# Patient Record
Sex: MALE | Race: WHITE | Employment: OTHER | URBAN - METROPOLITAN AREA
[De-identification: names, ages, dates, MRNs, and addresses within clinical notes are randomized per-mention and may not be internally consistent; named-entity substitution may affect disease eponyms.]

---

## 2020-10-27 ENCOUNTER — HOSPITAL ENCOUNTER (OUTPATIENT)
Age: 65
Discharge: HOME | End: 2020-10-29

## 2020-10-27 PROCEDURE — 88305 TISSUE EXAM BY PATHOLOGIST: CPT

## 2021-05-10 ENCOUNTER — HOSPITAL ENCOUNTER (OUTPATIENT)
Age: 66
Discharge: HOME | End: 2021-05-10
Payer: MEDICARE

## 2021-05-10 LAB
HEPATITIS C ANTIBODY INTERPRETATION: NORMAL
PROSTATE SPECIFIC ANTIGEN: 1.62 NG/ML (ref 0–4)

## 2021-05-10 PROCEDURE — G0103 PSA SCREENING: HCPCS

## 2021-05-10 PROCEDURE — 36415 COLL VENOUS BLD VENIPUNCTURE: CPT

## 2021-05-10 PROCEDURE — 86803 HEPATITIS C AB TEST: CPT

## 2021-10-11 ENCOUNTER — HOSPITAL ENCOUNTER (OUTPATIENT)
Age: 66
Discharge: HOME | End: 2021-10-11
Payer: MEDICARE

## 2021-10-11 LAB
ALBUMIN SERPL-MCNC: 4.3 G/DL (ref 3.5–5.2)
ALP BLD-CCNC: 77 U/L (ref 40–129)
ALT SERPL-CCNC: 12 U/L (ref 0–40)
ANION GAP SERPL CALCULATED.3IONS-SCNC: 9 MMOL/L (ref 7–16)
AST SERPL-CCNC: 14 U/L (ref 0–39)
BASOPHILS ABSOLUTE: 0.05 E9/L (ref 0–0.2)
BASOPHILS RELATIVE PERCENT: 0.8 % (ref 0–2)
BILIRUB SERPL-MCNC: 0.6 MG/DL (ref 0–1.2)
BUN BLDV-MCNC: 18 MG/DL (ref 6–23)
C-REACTIVE PROTEIN, HIGH SENSITIVITY: 0.6 MG/L (ref 0–3)
CALCIUM SERPL-MCNC: 9.5 MG/DL (ref 8.6–10.2)
CHLORIDE BLD-SCNC: 105 MMOL/L (ref 98–107)
CHOLESTEROL, TOTAL: 193 MG/DL (ref 0–199)
CO2: 28 MMOL/L (ref 22–29)
CREAT SERPL-MCNC: 1 MG/DL (ref 0.7–1.2)
EOSINOPHILS ABSOLUTE: 0.34 E9/L (ref 0.05–0.5)
EOSINOPHILS RELATIVE PERCENT: 5.3 % (ref 0–6)
GFR AFRICAN AMERICAN: >60
GFR NON-AFRICAN AMERICAN: >60 ML/MIN/1.73
GLUCOSE BLD-MCNC: 109 MG/DL (ref 74–99)
HCT VFR BLD CALC: 46.3 % (ref 37–54)
HDLC SERPL-MCNC: 46 MG/DL
HEMOGLOBIN: 15.3 G/DL (ref 12.5–16.5)
IMMATURE GRANULOCYTES #: 0.02 E9/L
IMMATURE GRANULOCYTES %: 0.3 % (ref 0–5)
LDL CHOLESTEROL CALCULATED: 118 MG/DL (ref 0–99)
LYMPHOCYTES ABSOLUTE: 2.51 E9/L (ref 1.5–4)
LYMPHOCYTES RELATIVE PERCENT: 39.5 % (ref 20–42)
MCH RBC QN AUTO: 31.7 PG (ref 26–35)
MCHC RBC AUTO-ENTMCNC: 33 % (ref 32–34.5)
MCV RBC AUTO: 95.9 FL (ref 80–99.9)
MONOCYTES ABSOLUTE: 0.55 E9/L (ref 0.1–0.95)
MONOCYTES RELATIVE PERCENT: 8.6 % (ref 2–12)
NEUTROPHILS ABSOLUTE: 2.89 E9/L (ref 1.8–7.3)
NEUTROPHILS RELATIVE PERCENT: 45.5 % (ref 43–80)
PDW BLD-RTO: 12.8 FL (ref 11.5–15)
PLATELET # BLD: 284 E9/L (ref 130–450)
PMV BLD AUTO: 10.3 FL (ref 7–12)
POTASSIUM SERPL-SCNC: 4.1 MMOL/L (ref 3.5–5)
RBC # BLD: 4.83 E12/L (ref 3.8–5.8)
SODIUM BLD-SCNC: 142 MMOL/L (ref 132–146)
T3 FREE: 2.9 PG/ML (ref 2–4.4)
T3 TOTAL: 110.6 NG/DL (ref 80–200)
T4 FREE: 1.23 NG/DL (ref 0.93–1.7)
T4 TOTAL: 7.6 MCG/DL (ref 4.5–11.7)
TOTAL PROTEIN: 7.2 G/DL (ref 6.4–8.3)
TRIGL SERPL-MCNC: 147 MG/DL (ref 0–149)
TSH SERPL DL<=0.05 MIU/L-ACNC: 4.46 UIU/ML (ref 0.27–4.2)
VITAMIN D 25-HYDROXY: 58 NG/ML (ref 30–100)
VLDLC SERPL CALC-MCNC: 29 MG/DL
WBC # BLD: 6.4 E9/L (ref 4.5–11.5)

## 2021-10-11 PROCEDURE — 85025 COMPLETE CBC W/AUTO DIFF WBC: CPT

## 2021-10-11 PROCEDURE — 84439 ASSAY OF FREE THYROXINE: CPT

## 2021-10-11 PROCEDURE — 84443 ASSAY THYROID STIM HORMONE: CPT

## 2021-10-11 PROCEDURE — 80061 LIPID PANEL: CPT

## 2021-10-11 PROCEDURE — 36415 COLL VENOUS BLD VENIPUNCTURE: CPT

## 2021-10-11 PROCEDURE — 86141 C-REACTIVE PROTEIN HS: CPT

## 2021-10-11 PROCEDURE — 80053 COMPREHEN METABOLIC PANEL: CPT

## 2021-10-11 PROCEDURE — 82306 VITAMIN D 25 HYDROXY: CPT

## 2021-10-11 PROCEDURE — 84481 FREE ASSAY (FT-3): CPT

## 2022-04-11 ENCOUNTER — HOSPITAL ENCOUNTER (OUTPATIENT)
Age: 67
Discharge: HOME | End: 2022-04-11
Payer: MEDICARE

## 2022-04-11 LAB
ANION GAP SERPL CALCULATED.3IONS-SCNC: 7 MMOL/L (ref 7–16)
BUN BLDV-MCNC: 19 MG/DL (ref 6–23)
CALCIUM SERPL-MCNC: 9.6 MG/DL (ref 8.6–10.2)
CHLORIDE BLD-SCNC: 103 MMOL/L (ref 98–107)
CHOLESTEROL, TOTAL: 192 MG/DL (ref 0–199)
CO2: 30 MMOL/L (ref 22–29)
CREAT SERPL-MCNC: 1 MG/DL (ref 0.7–1.2)
GFR AFRICAN AMERICAN: >60
GFR NON-AFRICAN AMERICAN: >60 ML/MIN/1.73
GLUCOSE BLD-MCNC: 115 MG/DL (ref 74–99)
HBA1C MFR BLD: 5.8 % (ref 4–5.6)
HDLC SERPL-MCNC: 49 MG/DL
LDL CHOLESTEROL CALCULATED: 101 MG/DL (ref 0–99)
POTASSIUM SERPL-SCNC: 4.8 MMOL/L (ref 3.5–5)
SODIUM BLD-SCNC: 140 MMOL/L (ref 132–146)
T3 FREE: 3.3 PG/ML (ref 2–4.4)
T3 TOTAL: 105.7 NG/DL (ref 80–200)
T4 FREE: 1.16 NG/DL (ref 0.93–1.7)
TRIGL SERPL-MCNC: 210 MG/DL (ref 0–149)
TSH SERPL DL<=0.05 MIU/L-ACNC: 3.22 UIU/ML (ref 0.27–4.2)
VLDLC SERPL CALC-MCNC: 42 MG/DL

## 2022-04-11 PROCEDURE — 80048 BASIC METABOLIC PNL TOTAL CA: CPT

## 2022-04-11 PROCEDURE — 84439 ASSAY OF FREE THYROXINE: CPT

## 2022-04-11 PROCEDURE — 84482 T3 REVERSE: CPT

## 2022-04-11 PROCEDURE — 84481 FREE ASSAY (FT-3): CPT

## 2022-04-11 PROCEDURE — 83036 HEMOGLOBIN GLYCOSYLATED A1C: CPT

## 2022-04-11 PROCEDURE — 84443 ASSAY THYROID STIM HORMONE: CPT

## 2022-04-11 PROCEDURE — 36415 COLL VENOUS BLD VENIPUNCTURE: CPT

## 2022-04-11 PROCEDURE — 80061 LIPID PANEL: CPT

## 2022-04-16 LAB — T3 REVERSE: 13.6 NG/DL (ref 9–27)

## 2023-12-01 PROBLEM — E78.5 HLD (HYPERLIPIDEMIA): Status: ACTIVE | Noted: 2023-12-01

## 2023-12-01 PROBLEM — J44.9 COPD (CHRONIC OBSTRUCTIVE PULMONARY DISEASE) (HCC): Status: ACTIVE | Noted: 2023-12-01

## 2023-12-04 ENCOUNTER — OFFICE VISIT (OUTPATIENT)
Dept: CARDIOLOGY CLINIC | Age: 68
End: 2023-12-04
Payer: MEDICARE

## 2023-12-04 VITALS
BODY MASS INDEX: 24.8 KG/M2 | OXYGEN SATURATION: 96 % | SYSTOLIC BLOOD PRESSURE: 141 MMHG | RESPIRATION RATE: 16 BRPM | HEIGHT: 67 IN | DIASTOLIC BLOOD PRESSURE: 89 MMHG | WEIGHT: 158 LBS | HEART RATE: 72 BPM

## 2023-12-04 DIAGNOSIS — I48.0 PAF (PAROXYSMAL ATRIAL FIBRILLATION) (HCC): Primary | ICD-10-CM

## 2023-12-04 PROCEDURE — G8484 FLU IMMUNIZE NO ADMIN: HCPCS | Performed by: INTERNAL MEDICINE

## 2023-12-04 PROCEDURE — G8420 CALC BMI NORM PARAMETERS: HCPCS | Performed by: INTERNAL MEDICINE

## 2023-12-04 PROCEDURE — 4004F PT TOBACCO SCREEN RCVD TLK: CPT | Performed by: INTERNAL MEDICINE

## 2023-12-04 PROCEDURE — 3017F COLORECTAL CA SCREEN DOC REV: CPT | Performed by: INTERNAL MEDICINE

## 2023-12-04 PROCEDURE — 1123F ACP DISCUSS/DSCN MKR DOCD: CPT | Performed by: INTERNAL MEDICINE

## 2023-12-04 PROCEDURE — 93000 ELECTROCARDIOGRAM COMPLETE: CPT | Performed by: INTERNAL MEDICINE

## 2023-12-04 PROCEDURE — 99205 OFFICE O/P NEW HI 60 MIN: CPT | Performed by: INTERNAL MEDICINE

## 2023-12-04 PROCEDURE — G8427 DOCREV CUR MEDS BY ELIG CLIN: HCPCS | Performed by: INTERNAL MEDICINE

## 2023-12-04 RX ORDER — LEVOTHYROXINE SODIUM 0.1 MG/1
25 TABLET ORAL DAILY
COMMUNITY

## 2023-12-04 RX ORDER — DILTIAZEM HYDROCHLORIDE 120 MG/1
120 CAPSULE, EXTENDED RELEASE ORAL DAILY
COMMUNITY
End: 2023-12-04 | Stop reason: SDUPTHER

## 2023-12-04 RX ORDER — ATORVASTATIN CALCIUM 10 MG/1
10 TABLET, FILM COATED ORAL DAILY
COMMUNITY

## 2023-12-04 RX ORDER — DILTIAZEM HYDROCHLORIDE 120 MG/1
120 TABLET, FILM COATED ORAL 4 TIMES DAILY
COMMUNITY
End: 2023-12-04 | Stop reason: CLARIF

## 2023-12-04 RX ORDER — DILTIAZEM HYDROCHLORIDE 120 MG/1
120 CAPSULE, EXTENDED RELEASE ORAL 2 TIMES DAILY
COMMUNITY
End: 2023-12-04 | Stop reason: CLARIF

## 2023-12-05 ENCOUNTER — TELEPHONE (OUTPATIENT)
Dept: CARDIOLOGY CLINIC | Age: 68
End: 2023-12-05

## 2023-12-05 DIAGNOSIS — I48.0 PAF (PAROXYSMAL ATRIAL FIBRILLATION) (HCC): Primary | ICD-10-CM

## 2023-12-05 RX ORDER — DILTIAZEM HYDROCHLORIDE 120 MG/1
120 CAPSULE, EXTENDED RELEASE ORAL DAILY
Qty: 60 CAPSULE | Refills: 5 | Status: SHIPPED
Start: 2023-12-05 | End: 2023-12-05 | Stop reason: SDUPTHER

## 2023-12-05 NOTE — TELEPHONE ENCOUNTER
Patient called and stated she has questions that he forget yesterday    He would like to know his EF from his echo in [place]    He would like to know if he should still be taking ASA    He also started the Eliquis is going to cost him 500.00 a month and he cannot afford this.   Please advise on what he can be switched to

## 2023-12-06 RX ORDER — DILTIAZEM HYDROCHLORIDE 120 MG/1
120 CAPSULE, EXTENDED RELEASE ORAL 2 TIMES DAILY
Qty: 60 CAPSULE | Refills: 5 | Status: SHIPPED | OUTPATIENT
Start: 2023-12-06

## 2023-12-08 ENCOUNTER — TELEPHONE (OUTPATIENT)
Dept: CARDIOLOGY CLINIC | Age: 68
End: 2023-12-08

## 2023-12-08 NOTE — TELEPHONE ENCOUNTER
Patent given coupon for Eliquis 30 days free until then      Grecia notified at [address] [facility]

## 2023-12-08 NOTE — TELEPHONE ENCOUNTER
Kit Astudillo from [facility], [address] clinic called and stated they cannot get patient in for evaluation until after the beginning of the new year. Does this patient need to be on a blood thinner prior to this? His PCP is in [facility] [employer] so they are asking if we can manage  this until then.   Please advise

## 2023-12-13 RX ORDER — WARFARIN SODIUM 5 MG/1
TABLET ORAL
Qty: 30 TABLET | Refills: 3 | Status: SHIPPED | OUTPATIENT
Start: 2023-12-13

## 2024-01-11 ENCOUNTER — HOSPITAL ENCOUNTER (OUTPATIENT)
Dept: PHARMACY | Age: 69
Setting detail: THERAPIES SERIES
Discharge: HOME | End: 2024-01-11
Payer: MEDICARE

## 2024-01-11 VITALS
DIASTOLIC BLOOD PRESSURE: 70 MMHG | BODY MASS INDEX: 25.53 KG/M2 | WEIGHT: 163 LBS | SYSTOLIC BLOOD PRESSURE: 112 MMHG | HEART RATE: 71 BPM

## 2024-01-11 DIAGNOSIS — I48.0 PAF (PAROXYSMAL ATRIAL FIBRILLATION) (HCC): Primary | ICD-10-CM

## 2024-01-11 LAB — INTERNATIONAL NORMALIZATION RATIO, POC: 1.9

## 2024-01-11 PROCEDURE — 85610 PROTHROMBIN TIME: CPT

## 2024-01-11 PROCEDURE — 99203 OFFICE O/P NEW LOW 30 MIN: CPT

## 2024-01-11 RX ORDER — VITAMIN E 268 MG
400 CAPSULE ORAL DAILY
COMMUNITY

## 2024-01-11 RX ORDER — LEVOTHYROXINE SODIUM 0.03 MG/1
1.5 TABLET ORAL DAILY
COMMUNITY
Start: 2023-06-19

## 2024-01-11 RX ORDER — METFORMIN HYDROCHLORIDE 500 MG/1
500 TABLET, EXTENDED RELEASE ORAL DAILY
COMMUNITY
Start: 2023-06-07

## 2024-01-11 RX ORDER — CHLORAL HYDRATE 500 MG
1 CAPSULE ORAL DAILY
COMMUNITY

## 2024-01-11 RX ORDER — ZINC 25 MG
25 TABLET ORAL DAILY
COMMUNITY

## 2024-01-11 RX ORDER — ACETAMINOPHEN 160 MG
2000 TABLET,DISINTEGRATING ORAL DAILY
COMMUNITY

## 2024-01-11 RX ORDER — LANOLIN ALCOHOL/MO/W.PET/CERES
400 CREAM (GRAM) TOPICAL DAILY
COMMUNITY

## 2024-01-11 RX ORDER — M-VIT,TX,IRON,MINS/CALC/FOLIC 27MG-0.4MG
1 TABLET ORAL DAILY
COMMUNITY

## 2024-01-11 NOTE — PROGRESS NOTES
[facility]ticoagulation [facility] (Medication Management)  [address] [place], [place], [address]  Phone: [phone]    Face-To-Face Visit, Initial Visit Encounter    Patient Findings       Positives:  Change in alcohol use (NO ALCOHOL IN ABOUT A MONTH), Change in medications (PATIENT REPORTS HEADACHE SINCE STARTING DILTIAZEM (2-8% INCIDENCE), WILL DISCUSS W/ DR. YEUNG NEXT MONTH)    Negatives:  Signs/symptoms of thrombosis, Signs/symptoms of bleeding, Laboratory test error suspected, Change in health, Change in activity, Upcoming invasive procedure, Emergency department visit, Upcoming dental procedure, Missed doses, Extra doses, Change in diet/appetite, Hospital admission, Bruising, Other complaints           Patient  reports that he has never smoked. He does not have any smokeless tobacco history on file.     Assessment/Plan:  This is patient's first visit to the [facility]ticoagulation [facility] (Medication Management). The patient was provided with all standard clinic warfarin education (MOA, s/s bleeding, INR monitoring, compliance, drug/food/substance interactions, clinic procedures).  The patient was provided with the following supplemental printed handouts: Warfarin Information, Vitamin K Content, and When to Contact the Anticoagulation Clinic.     John DIANE Cline Jr. was referred by Dr. Yeung to manage warfarin for his atrial fibrillation.  Patient's goal INR range is 2-3. Patient's [id number] score:  Atrial Fibrillation CHADSVASC2 Score Stroke Risk:   68 y.o. 65-74 - 1   male Male - 0   CHF HX: No - 0   HTN HX: No - 0   Stroke/TIA/Thromboembolism No - 0   Vascular Disease HX: No - 0   Diabetes Mellitus Yes - 1   CHADSVASC 2 Score 2      Annual Stroke Risk 2.2%- moderate-high      Patient has been on warfarin 5 mg daily since 1/8/24. He is also currently on Eliquis 5 mg bid and will transitioning to warfarin due to the cost of Eliquis.      INR is subtherapeutic at 1.9, goal

## 2024-01-15 ENCOUNTER — HOSPITAL ENCOUNTER (OUTPATIENT)
Dept: PHARMACY | Age: 69
Setting detail: THERAPIES SERIES
Discharge: HOME | End: 2024-01-15
Payer: MEDICARE

## 2024-01-15 VITALS
BODY MASS INDEX: 25.53 KG/M2 | WEIGHT: 163 LBS | SYSTOLIC BLOOD PRESSURE: 117 MMHG | DIASTOLIC BLOOD PRESSURE: 77 MMHG | HEART RATE: 61 BPM

## 2024-01-15 DIAGNOSIS — I48.0 PAF (PAROXYSMAL ATRIAL FIBRILLATION) (HCC): Primary | ICD-10-CM

## 2024-01-15 LAB — INR BLD: 2.6

## 2024-01-15 PROCEDURE — 99212 OFFICE O/P EST SF 10 MIN: CPT

## 2024-01-15 PROCEDURE — 85610 PROTHROMBIN TIME: CPT

## 2024-01-15 RX ORDER — ASCORBIC ACID 500 MG
500 TABLET ORAL DAILY
COMMUNITY

## 2024-01-15 NOTE — PROGRESS NOTES
[facility] (Medication Management)  [address] [place], [place], [address]  Phone: [phone]    Face-To-Face Visit  Patient Findings       Positives:  Change in alcohol use (1 beer sunday night), Change in diet/appetite (Ate iceberg lettuce for 2 days since last visit)    Negatives:  Signs/symptoms of thrombosis, Signs/symptoms of bleeding, Laboratory test error suspected, Change in health, Change in activity, Upcoming invasive procedure, Emergency department visit, Upcoming dental procedure, Missed doses, Extra doses, Change in medications, Hospital admission, Bruising, Other complaints           Assessment/Plan:  Warfarin indication: atrial fibrillation      INR today is therapeutic at 2.6, goal is 2-3. PATIENT STARTED WARFARIN ON 01/08, WEEKLY DOSE WAS 27.5 MG. PT NOT AT STEADY STATE YET, ANTICIPATE AROUND 25 MG WEEKLY DOSE.     Warfarin Dose: 5 MG MON, 2.5 MG TUE, 5 MG WED    Follow Up: 01/18/24      Emi Nova PharmD Candidate 2024  1/15/2024 4:36 PM     For Pharmacy Admin Tracking Only    Intervention Detail: Dose Adjustment: 1, reason: Therapy Optimization  Total # of Interventions Recommended: 1  Total # of Interventions Accepted: 1  Time Spent (min): 20

## 2024-01-18 ENCOUNTER — HOSPITAL ENCOUNTER (OUTPATIENT)
Dept: PHARMACY | Age: 69
Setting detail: THERAPIES SERIES
Discharge: HOME | End: 2024-01-18
Payer: MEDICARE

## 2024-01-18 VITALS
BODY MASS INDEX: 25.44 KG/M2 | SYSTOLIC BLOOD PRESSURE: 148 MMHG | WEIGHT: 162.4 LBS | DIASTOLIC BLOOD PRESSURE: 78 MMHG | HEART RATE: 103 BPM

## 2024-01-18 DIAGNOSIS — I48.0 PAF (PAROXYSMAL ATRIAL FIBRILLATION) (HCC): Primary | ICD-10-CM

## 2024-01-18 LAB — INR BLD: 2.8

## 2024-01-18 PROCEDURE — 85610 PROTHROMBIN TIME: CPT

## 2024-01-18 NOTE — PROGRESS NOTES
[facility] (Medication Management)  [address] [place], [address]  Phone: [phone]    Face-To-Face Visit  Patient Findings       Positives:  Change in diet/appetite (One salad, limited vitamin K intake)    Negatives:  Signs/symptoms of thrombosis, Signs/symptoms of bleeding, Laboratory test error suspected, Change in health, Change in alcohol use, Change in activity, Upcoming invasive procedure, Emergency department visit, Upcoming dental procedure, Missed doses, Extra doses, Change in medications, Hospital admission, Bruising, Other complaints    Comments:  Dr. Grewal 02/07           Assessment/Plan:  Warfarin indication: atrial fibrillation         INR today is therapeutic at 2.8, goal is 2-3. WARFARIN STARTED ON 01/08, PT IS APPROACHING STEADY STATE, WILL DECREASE WEEKLY DOSE BY 10%.    Warfarin Dose: CHANGE DOSE TO (5 MG EVERY MON, FRI; 2.5 MG ALL OTHER DAYS).    Follow Up: 1 week      Emi Nova PharmD Candidate 2024 1/18/2024 9:09 AM     For Pharmacy Admin Tracking Only    Intervention Detail: Dose Adjustment: 1, reason: Therapy De-escalation  Total # of Interventions Recommended: 1  Total # of Interventions Accepted: 1  Time Spent (min): 20

## 2024-01-25 ENCOUNTER — HOSPITAL ENCOUNTER (OUTPATIENT)
Dept: PHARMACY | Age: 69
Setting detail: THERAPIES SERIES
Discharge: HOME | End: 2024-01-25
Payer: MEDICARE

## 2024-01-25 ENCOUNTER — TELEPHONE (OUTPATIENT)
Dept: CARDIOLOGY CLINIC | Age: 69
End: 2024-01-25

## 2024-01-25 VITALS — SYSTOLIC BLOOD PRESSURE: 127 MMHG | DIASTOLIC BLOOD PRESSURE: 96 MMHG | HEART RATE: 163 BPM

## 2024-01-25 DIAGNOSIS — I48.0 PAF (PAROXYSMAL ATRIAL FIBRILLATION) (HCC): Primary | ICD-10-CM

## 2024-01-25 LAB — INR BLD: 3.4

## 2024-01-25 PROCEDURE — 85610 PROTHROMBIN TIME: CPT

## 2024-01-25 RX ORDER — METOPROLOL SUCCINATE 50 MG/1
50 TABLET, EXTENDED RELEASE ORAL 2 TIMES DAILY
Qty: 60 TABLET | Refills: 5 | Status: SHIPPED | OUTPATIENT
Start: 2024-01-25

## 2024-01-25 NOTE — PROGRESS NOTES
[facility] Anticoagulation Clinic (Medication Management)  [address] [place], [place], [address]  Phone: [phone]    Face-To-Face Visit  Patient Findings       Positives:  Change in health (Feels \"off\" today, has been having AFib symptoms, highest HR reading 191 & has been in 160s. Pulse was 163 today during BP check)    Negatives:  Signs/symptoms of thrombosis, Signs/symptoms of bleeding, Laboratory test error suspected, Change in alcohol use, Change in activity, Upcoming invasive procedure, Emergency department visit, Upcoming dental procedure, Missed doses, Extra doses, Change in medications, Change in diet/appetite, Hospital admission, Bruising, Other complaints    Comments:  Cardio appt Feb 7th           Assessment/Plan:  Warfarin indication: atrial fibrillation       INR today is SUPRAtherapeutic at 3.4, goal is 2-3. WARFARIN STARTED ON 01/08, PT IS APPROACHING STEADY STATE, WILL DECREASE WEEKLY DOSE BY 11%    Warfarin Dose: HOLD TODAY'S DOSE THEN DECREASE WEEKLY REGIMEN BY 11% TO 5 MG EVERY MON, 2.5 MG ALL OTHER DAYS    Follow Up: 1 week    DUE TO PATIENT'S AFIB SYMPTOM COMPLAINTS & INCREASED HR, ADVISED PT TO CALL CARDIO OFFICE TODAY AND NOTIFY THEM ABOUT SYMPTOMS. HAS APPT PREVIOUSLY SCHEDULED ON 02/07.      Emi Nova PharmD Candidate 2024 1/25/2024 11:20 AM     For Pharmacy Admin Tracking Only    Intervention Detail: Dose Adjustment: 1, reason: Therapy De-escalation  Total # of Interventions Recommended: 1  Total # of Interventions Accepted: 1  Time Spent (min): 15

## 2024-01-25 NOTE — TELEPHONE ENCOUNTER
Patient does have an appt to see you 02/07/24 however, he has been in a-fib 4 times in the last 24 hours.  He is wondering if he can his medication changed to something that might help him more.      Please advise

## 2024-01-25 NOTE — TELEPHONE ENCOUNTER
Patient notified and instructed on medication changes as he was taking diltizaem bid.  Appt scheduled for next week

## 2024-02-01 ENCOUNTER — HOSPITAL ENCOUNTER (OUTPATIENT)
Dept: PHARMACY | Age: 69
Setting detail: THERAPIES SERIES
Discharge: HOME | End: 2024-02-01
Payer: MEDICARE

## 2024-02-01 ENCOUNTER — OFFICE VISIT (OUTPATIENT)
Dept: CARDIOLOGY CLINIC | Age: 69
End: 2024-02-01
Payer: MEDICARE

## 2024-02-01 VITALS
DIASTOLIC BLOOD PRESSURE: 68 MMHG | HEART RATE: 36 BPM | SYSTOLIC BLOOD PRESSURE: 122 MMHG | BODY MASS INDEX: 25.47 KG/M2 | WEIGHT: 162.6 LBS

## 2024-02-01 VITALS
RESPIRATION RATE: 18 BRPM | DIASTOLIC BLOOD PRESSURE: 82 MMHG | HEART RATE: 51 BPM | BODY MASS INDEX: 25.39 KG/M2 | SYSTOLIC BLOOD PRESSURE: 136 MMHG | WEIGHT: 161.8 LBS | HEIGHT: 67 IN

## 2024-02-01 DIAGNOSIS — I48.0 PAF (PAROXYSMAL ATRIAL FIBRILLATION) (HCC): Primary | ICD-10-CM

## 2024-02-01 LAB — INTERNATIONAL NORMALIZATION RATIO, POC: 1.7

## 2024-02-01 PROCEDURE — 99214 OFFICE O/P EST MOD 30 MIN: CPT | Performed by: INTERNAL MEDICINE

## 2024-02-01 PROCEDURE — 1036F TOBACCO NON-USER: CPT | Performed by: INTERNAL MEDICINE

## 2024-02-01 PROCEDURE — G8484 FLU IMMUNIZE NO ADMIN: HCPCS | Performed by: INTERNAL MEDICINE

## 2024-02-01 PROCEDURE — 3017F COLORECTAL CA SCREEN DOC REV: CPT | Performed by: INTERNAL MEDICINE

## 2024-02-01 PROCEDURE — 85610 PROTHROMBIN TIME: CPT

## 2024-02-01 PROCEDURE — G8419 CALC BMI OUT NRM PARAM NOF/U: HCPCS | Performed by: INTERNAL MEDICINE

## 2024-02-01 PROCEDURE — 99212 OFFICE O/P EST SF 10 MIN: CPT

## 2024-02-01 PROCEDURE — 1123F ACP DISCUSS/DSCN MKR DOCD: CPT | Performed by: INTERNAL MEDICINE

## 2024-02-01 PROCEDURE — 93000 ELECTROCARDIOGRAM COMPLETE: CPT | Performed by: INTERNAL MEDICINE

## 2024-02-01 PROCEDURE — G8427 DOCREV CUR MEDS BY ELIG CLIN: HCPCS | Performed by: INTERNAL MEDICINE

## 2024-02-01 NOTE — PROGRESS NOTES
Chief Complaint   Patient presents with    Atrial Fibrillation       Patient Active Problem List    Diagnosis Date Noted    PAF (paroxysmal atrial fibrillation) ([facility]) 12/04/2023     Overview Note:     A. CHADSVASC = 1  B. Outside echo with no structural disease, normal LA size      COPD (chronic obstructive pulmonary disease) ([facility]) 12/01/2023    HLD (hyperlipidemia) 12/01/2023       Current Outpatient Medications   Medication Sig Dispense Refill    metoprolol succinate (TOPROL XL) 50 MG extended release tablet Take 1 tablet by mouth in the morning and at bedtime 60 tablet 5    vitamin C (ASCORBIC ACID) 500 MG tablet Take 1 tablet by mouth daily      metFORMIN (GLUCOPHAGE-XR) 500 MG extended release tablet Take 1 tablet by mouth daily      levothyroxine (SYNTHROID) 25 MCG tablet Take 1.5 tablets by mouth Daily Indications: 37.5 mg      Omega-3 Fatty Acids (OMEGA-3 FISH OIL) 1000 MG CAPS Take 1 capsule by mouth daily      Glucosamine-Chondroit-Vit C-Mn (GLUCOSAMINE CHONDR 1500 COMPLX PO) Take 2 tablets by mouth daily      Zinc 25 MG TABS Take 25 mg by mouth daily      Cholecalciferol (VITAMIN D3) 50 MCG (2000 UT) CAPS Take 1 capsule by mouth daily      vitamin E 400 UNIT capsule Take 1 capsule by mouth daily      magnesium oxide (MAG-OX) 400 (240 Mg) MG tablet Take 1 tablet by mouth daily      Cyanocobalamin 2500 MCG TABS Take 2,500 mcg by mouth Twice a Week Sunday and Wednesday      Multiple Vitamins-Minerals (THERAPEUTIC MULTIVITAMIN-MINERALS) tablet Take 1 tablet by mouth daily [place] [place] Over 50, 30 mcg vitamin K      warfarin (COUMADIN) 5 MG tablet Take 1 tablet by mouth once daily as directed by Anticoagulation Clinic 30 tablet 3    atorvastatin (LIPITOR) 10 MG tablet Take 1 tablet by mouth daily       No current facility-administered medications for this visit.        Allergies   Allergen Reactions    Codeine     Shellfish-Derived Products        Vitals:    02/01/24 0827   BP: 136/82   Pulse: 51   Resp: 18

## 2024-02-01 NOTE — PROGRESS NOTES
[facility] Anticoagulation Clinic (Medication Management)  [address] [place], [place], [address]  Phone: [phone]    Face-To-Face Visit  Patient Findings       Positives:  Change in medications (HE IS OFF DILTIAZEM, ON METOPROLOL)    Negatives:  Signs/symptoms of thrombosis, Signs/symptoms of bleeding, Laboratory test error suspected, Change in health, Change in alcohol use, Change in activity, Upcoming invasive procedure, Emergency department visit, Upcoming dental procedure, Missed doses, Extra doses, Change in diet/appetite (HALUSKI EVERY DAY THIS WEEK), Hospital admission, Bruising, Other complaints    Comments:  DR. YEUNG - MAY 2ND           Assessment/Plan:  Warfarin indication: atrial fibrillation      INR today is SUBtherapeutic at 1.7, goal is 2-3. WARFARIN STARTED ON 1/8, STILL ADJUSTING TO OBTAIN EFFECTIVE MAINTENANCE DOSE.    Warfarin Dose: INCREASE WEEKLY DOSE 12.5% TO 5 MG EVERY MON./FRI.; 2.5 MG ALL OTHER DAYS    Follow Up: 1.5 weeks      Brianda Walden [facility] PharmD, BCACP 2/1/2024 3:52 PM    For Pharmacy Admin Tracking Only    Intervention Detail: Dose Adjustment: 1, reason: Therapy Optimization  Total # of Interventions Recommended: 1  Total # of Interventions Accepted: 1  Time Spent (min): 15

## 2024-02-12 ENCOUNTER — HOSPITAL ENCOUNTER (OUTPATIENT)
Dept: PHARMACY | Age: 69
Setting detail: THERAPIES SERIES
Discharge: HOME | End: 2024-02-12
Payer: MEDICARE

## 2024-02-12 VITALS
HEART RATE: 60 BPM | SYSTOLIC BLOOD PRESSURE: 103 MMHG | BODY MASS INDEX: 25.72 KG/M2 | DIASTOLIC BLOOD PRESSURE: 68 MMHG | WEIGHT: 164.2 LBS

## 2024-02-12 DIAGNOSIS — I48.0 PAF (PAROXYSMAL ATRIAL FIBRILLATION) (HCC): Primary | ICD-10-CM

## 2024-02-12 LAB — INTERNATIONAL NORMALIZATION RATIO, POC: 2.2

## 2024-02-12 PROCEDURE — 99211 OFF/OP EST MAY X REQ PHY/QHP: CPT

## 2024-02-12 PROCEDURE — 85610 PROTHROMBIN TIME: CPT

## 2024-02-12 NOTE — PROGRESS NOTES
[facility] Anticoagulation Clinic (Medication Management)  [address] [place], [address]  Phone: [phone]    Face-To-Face Visit  Patient Findings       Positives:  Signs/symptoms of bleeding (HE PINCHED HIS FINGER IN A WHEELCHAIR AND HAS A BLOOD BLISTER)    Negatives:  Signs/symptoms of thrombosis, Laboratory test error suspected, Change in health, Change in alcohol use, Change in activity, Upcoming invasive procedure, Emergency department visit, Upcoming dental procedure, Missed doses, Extra doses, Change in medications, Change in diet/appetite, Hospital admission, Bruising, Other complaints    Comments:  Dr. Grewal May 2nd           Assessment/Plan:  Warfarin indication: atrial fibrillation      INR today is therapeutic at 2.2, goal is 2-3.    Warfarin Dose: same (5 mg every     Follow Up: 2 weeks      Brianda Walden [facility] PharmD, BCACP 2/12/2024 11:06 AM    For Pharmacy Admin Tracking Only    Intervention Detail:   Total # of Interventions Recommended: 0  Total # of Interventions Accepted: 0  Time Spent (min): 15

## 2024-02-26 ENCOUNTER — HOSPITAL ENCOUNTER (OUTPATIENT)
Dept: PHARMACY | Age: 69
Setting detail: THERAPIES SERIES
Discharge: HOME | End: 2024-02-26
Payer: MEDICARE

## 2024-02-26 VITALS
BODY MASS INDEX: 25.81 KG/M2 | DIASTOLIC BLOOD PRESSURE: 67 MMHG | HEART RATE: 60 BPM | WEIGHT: 164.8 LBS | SYSTOLIC BLOOD PRESSURE: 121 MMHG

## 2024-02-26 DIAGNOSIS — I48.0 PAF (PAROXYSMAL ATRIAL FIBRILLATION) (HCC): Primary | ICD-10-CM

## 2024-02-26 LAB — INTERNATIONAL NORMALIZATION RATIO, POC: 2.9

## 2024-02-26 PROCEDURE — 85610 PROTHROMBIN TIME: CPT

## 2024-02-26 NOTE — PROGRESS NOTES
[facility] Anticoagulation Clinic (Medication Management)  [address] [place], [place], [address]  Phone: [phone]    Face-To-Face Visit  Patient Findings       Negatives:  Signs/symptoms of thrombosis, Signs/symptoms of bleeding, Laboratory test error suspected, Change in health, Change in alcohol use, Change in activity, Upcoming invasive procedure, Emergency department visit, Upcoming dental procedure, Missed doses, Extra doses, Change in medications, Change in diet/appetite, Hospital admission, Bruising, Other complaints    Comments:  PCP - in April            Assessment/Plan:  Warfarin indication: atrial fibrillation      INR today is therapeutic at 2.9, goal is 2-3.    Warfarin Dose: same (5 mg every Mon./Fri.; 2.5 mg all other days)    Follow Up: 2 weeks      Brianda Walden RPH PharmD, BCACP 2/26/2024 4:01 PM  For Pharmacy Admin Tracking Only    Intervention Detail:   Total # of Interventions Recommended: 0  Total # of Interventions Accepted: 0  Time Spent (min): 15

## 2024-03-11 ENCOUNTER — HOSPITAL ENCOUNTER (OUTPATIENT)
Dept: PHARMACY | Age: 69
Setting detail: THERAPIES SERIES
Discharge: HOME | End: 2024-03-11
Payer: MEDICARE

## 2024-03-11 VITALS
SYSTOLIC BLOOD PRESSURE: 124 MMHG | BODY MASS INDEX: 25.94 KG/M2 | HEART RATE: 54 BPM | WEIGHT: 165.6 LBS | DIASTOLIC BLOOD PRESSURE: 83 MMHG

## 2024-03-11 DIAGNOSIS — I48.0 PAF (PAROXYSMAL ATRIAL FIBRILLATION) (HCC): Primary | ICD-10-CM

## 2024-03-11 LAB — INTERNATIONAL NORMALIZATION RATIO, POC: 2.9

## 2024-03-11 PROCEDURE — 85610 PROTHROMBIN TIME: CPT

## 2024-03-11 PROCEDURE — 99211 OFF/OP EST MAY X REQ PHY/QHP: CPT

## 2024-03-11 NOTE — PROGRESS NOTES
[facility] Anticoagulation Clinic (Medication Management)  [address] [place], [place], [address]  Phone: [phone]    Face-To-Face Visit  Patient Findings       Positives:  Change in medications (Taking Aleve prn (about once/week at most) and melatonin prn. We discussed the use of NSAIDS and OACs)    Negatives:  Signs/symptoms of thrombosis, Signs/symptoms of bleeding, Laboratory test error suspected, Change in health, Change in alcohol use, Change in activity, Upcoming invasive procedure, Emergency department visit, Upcoming dental procedure, Missed doses, Extra doses, Change in diet/appetite, Hospital admission, Bruising, Other complaints    Comments:  PCP in April           Assessment/Plan:  Warfarin indication: atrial fibrillation      INR today is therapeutic at 2.9, goal is 2-3.    Warfarin Dose: same (5 mg every Mon./Fri.; 2.5 mg all other days)    Follow Up: 3 weeks      Brianda Walden RPH PharmD, BCACP 3/11/2024 8:43 AM    For Pharmacy Admin Tracking Only    Intervention Detail:   Total # of Interventions Recommended: 0  Total # of Interventions Accepted: 0  Time Spent (min): 15

## 2024-04-01 ENCOUNTER — HOSPITAL ENCOUNTER (OUTPATIENT)
Dept: PHARMACY | Age: 69
Setting detail: THERAPIES SERIES
Discharge: HOME | End: 2024-04-01
Payer: MEDICARE

## 2024-04-01 VITALS
WEIGHT: 165 LBS | BODY MASS INDEX: 25.84 KG/M2 | SYSTOLIC BLOOD PRESSURE: 127 MMHG | DIASTOLIC BLOOD PRESSURE: 81 MMHG | HEART RATE: 54 BPM

## 2024-04-01 DIAGNOSIS — I48.0 PAF (PAROXYSMAL ATRIAL FIBRILLATION) (HCC): Primary | ICD-10-CM

## 2024-04-01 LAB — INTERNATIONAL NORMALIZATION RATIO, POC: 2.9

## 2024-04-01 PROCEDURE — 85610 PROTHROMBIN TIME: CPT

## 2024-04-01 PROCEDURE — 99211 OFF/OP EST MAY X REQ PHY/QHP: CPT

## 2024-04-01 NOTE — PROGRESS NOTES
[facility] Anticoagulation Clinic (Medication Management)  [address] [place], [address]  Phone: [phone]    Face-To-Face Visit  Patient Findings       Positives:  Change in diet/appetite (HAS BEEN EATING MORE VIT K)    Negatives:  Signs/symptoms of thrombosis, Signs/symptoms of bleeding, Laboratory test error suspected, Change in health, Change in alcohol use, Change in activity, Upcoming invasive procedure, Emergency department visit, Upcoming dental procedure, Missed doses, Extra doses, Change in medications, Hospital admission, Bruising, Other complaints    Comments:  PCP 4/16  Dr. Grewal May           Assessment/Plan:  Warfarin indication: atrial fibrillation       INR today is therapeutic at 2.9, goal is 2-3    Warfarin Dose: SAME     Follow Up: 3 weeks      Kelsy Garcia RP PharmD, BCACP 4/1/2024 8:58 AM    For Pharmacy Admin Tracking Only    Intervention Detail:   Total # of Interventions Recommended: 0  Total # of Interventions Accepted: 0  Time Spent (min): 15

## 2024-04-22 ENCOUNTER — HOSPITAL ENCOUNTER (OUTPATIENT)
Dept: PHARMACY | Age: 69
Setting detail: THERAPIES SERIES
Discharge: HOME | End: 2024-04-22
Payer: MEDICARE

## 2024-04-22 VITALS
BODY MASS INDEX: 26.03 KG/M2 | SYSTOLIC BLOOD PRESSURE: 142 MMHG | HEART RATE: 57 BPM | WEIGHT: 166.2 LBS | DIASTOLIC BLOOD PRESSURE: 88 MMHG

## 2024-04-22 DIAGNOSIS — I48.0 PAF (PAROXYSMAL ATRIAL FIBRILLATION) (HCC): Primary | ICD-10-CM

## 2024-04-22 LAB — INTERNATIONAL NORMALIZATION RATIO, POC: 2.7

## 2024-04-22 PROCEDURE — 85610 PROTHROMBIN TIME: CPT

## 2024-04-22 PROCEDURE — 99211 OFF/OP EST MAY X REQ PHY/QHP: CPT

## 2024-04-22 NOTE — PROGRESS NOTES
[facility] Anticoagulation Clinic (Medication Management)  [address] [place], [address]  Phone: [phone]    Face-To-Face Visit  Patient Findings       Positives:  Upcoming invasive procedure (Will be scheduling a colonscopy after meeting with Dr. Bañuelos), Extra doses (Took 5 mg instead of 2.5 mg on 4/20), Change in medications (Metoprolol decreased from 50 mg bid to 50 mg in AM and 50 mg in PM), Change in diet/appetite (Continues to include more vitamin K in his diet)    Negatives:  Signs/symptoms of thrombosis, Signs/symptoms of bleeding, Laboratory test error suspected, Change in health, Change in alcohol use, Change in activity, Emergency department visit, Upcoming dental procedure, Missed doses, Hospital admission, Bruising, Other complaints    Comments:  PCP f/u in July 5/2: Dr. Grewal           Assessment/Plan:  Warfarin indication: atrial fibrillation      INR today is therapeutic at 2.7, goal is 2-3.    Warfarin Dose: same (5 mg every Mon./Fri.; 2.5 mg all other days)    Follow Up: 4 weeks      Brianda Walden [facility] PharmD, BCACP 4/22/2024 9:24 AM    For Pharmacy Admin Tracking Only    Intervention Detail:   Total # of Interventions Recommended: 0  Total # of Interventions Accepted: 0  Time Spent (min): 15

## 2024-05-02 ENCOUNTER — OFFICE VISIT (OUTPATIENT)
Dept: CARDIOLOGY CLINIC | Age: 69
End: 2024-05-02
Payer: MEDICARE

## 2024-05-02 VITALS
WEIGHT: 166.8 LBS | DIASTOLIC BLOOD PRESSURE: 83 MMHG | HEIGHT: 67 IN | BODY MASS INDEX: 26.18 KG/M2 | HEART RATE: 54 BPM | RESPIRATION RATE: 16 BRPM | SYSTOLIC BLOOD PRESSURE: 122 MMHG

## 2024-05-02 DIAGNOSIS — I48.0 PAF (PAROXYSMAL ATRIAL FIBRILLATION) (HCC): Primary | ICD-10-CM

## 2024-05-02 PROCEDURE — G8419 CALC BMI OUT NRM PARAM NOF/U: HCPCS | Performed by: INTERNAL MEDICINE

## 2024-05-02 PROCEDURE — 93000 ELECTROCARDIOGRAM COMPLETE: CPT | Performed by: INTERNAL MEDICINE

## 2024-05-02 PROCEDURE — G8427 DOCREV CUR MEDS BY ELIG CLIN: HCPCS | Performed by: INTERNAL MEDICINE

## 2024-05-02 PROCEDURE — 99213 OFFICE O/P EST LOW 20 MIN: CPT | Performed by: INTERNAL MEDICINE

## 2024-05-02 PROCEDURE — 1123F ACP DISCUSS/DSCN MKR DOCD: CPT | Performed by: INTERNAL MEDICINE

## 2024-05-02 PROCEDURE — 1036F TOBACCO NON-USER: CPT | Performed by: INTERNAL MEDICINE

## 2024-05-02 PROCEDURE — 3017F COLORECTAL CA SCREEN DOC REV: CPT | Performed by: INTERNAL MEDICINE

## 2024-05-02 NOTE — PROGRESS NOTES
Vomiting       Vitals:    05/02/24 0745   BP: 122/83   Pulse: 54   Resp: 16   Weight: 75.7 kg (166 lb 12.8 oz)   Height: 1.702 m (5' 7\")                 SUBJECTIVE: John Cline Jr. presents to the office today for f/u.         ([place],  developed palpitations, ear pain, went to ED - I reviewed all records, including ekg tracings and echo report, and found to be in AF.  Placed on low dose calcium channel blocker  No OAC.   Subsequently tested positive for COVID)    Walking again for exercise, and hiking, feeling great  Thinks he had an AF episode - cannot show me any apple watch strips tho             Physical Exam   /83   Pulse 54   Resp 16   Ht 1.702 m (5' 7\")   Wt 75.7 kg (166 lb 12.8 oz)   BMI 26.12 kg/m²   Constitutional: Oriented to person, place, and time. Well-developed and well-nourished. No distress.    Neck: No JVD present. Carotid bruit is not present.   Cardiovascular:  slow  rate, regular rhythm, normal heart sounds and intact distal pulses.  No gallop and no friction rub.  No murmur heard.  Pulmonary/Chest: Effort normal and breath sounds normal.   Abdominal: Soft. Bowel sounds are normal. No distension and no mass.   Musculoskeletal: No edema   Neurological: Alert and oriented to person, place, and time.   Skin: Skin is warm and dry.   Psychiatric: Normal mood and affect. Behavior is normal.     EKG:  normal EKG, sinus bradycardia    ASSESSMENT AND PLAN:  Patient Active Problem List   Diagnosis    COPD (chronic obstructive pulmonary disease) ([facility])    HLD (hyperlipidemia)    PAF (paroxysmal atrial fibrillation) ([facility])     Paroxysmal non valvular atrial fibrillation with CHADSVASC = 1, on warfarin for OAC  Continue toprol at present dose  Discussed need to access apple watch strips, or better yet, get [employer] Mobile device  We would like to determine how essential long term OAC is  importance of well controlled HTN (goal BP < 130/80), adequate weight

## 2024-05-20 ENCOUNTER — HOSPITAL ENCOUNTER (OUTPATIENT)
Dept: PHARMACY | Age: 69
Setting detail: THERAPIES SERIES
Discharge: HOME | End: 2024-05-20
Payer: MEDICARE

## 2024-05-20 VITALS
SYSTOLIC BLOOD PRESSURE: 135 MMHG | HEART RATE: 60 BPM | DIASTOLIC BLOOD PRESSURE: 85 MMHG | WEIGHT: 166.2 LBS | BODY MASS INDEX: 26.03 KG/M2

## 2024-05-20 DIAGNOSIS — I48.0 PAF (PAROXYSMAL ATRIAL FIBRILLATION) (HCC): Primary | ICD-10-CM

## 2024-05-20 LAB — INTERNATIONAL NORMALIZATION RATIO, POC: 3.2

## 2024-05-20 PROCEDURE — 99212 OFFICE O/P EST SF 10 MIN: CPT

## 2024-05-20 PROCEDURE — 85610 PROTHROMBIN TIME: CPT

## 2024-05-20 NOTE — PROGRESS NOTES
[facility] Anticoagulation Clinic (Medication Management)  [address] [place], [address]  Phone: [phone]    Face-To-Face Visit  Patient Findings       Positives:  Upcoming invasive procedure ((Will be scheduling a colonscopy after meeting with Dr. Bañuelos on 6/5/24)), Upcoming dental procedure (Periodontist consult - 5/22)    Negatives:  Signs/symptoms of thrombosis, Signs/symptoms of bleeding, Laboratory test error suspected, Change in health, Change in alcohol use, Change in activity, Emergency department visit, Missed doses, Extra doses, Change in medications, Change in diet/appetite, Hospital admission, Bruising, Other complaints    Comments:  6/5: Dr. Osborn           Assessment/Plan:  Warfarin indication: atrial fibrillation      INR today is supratherapeutic at 3.2, goal is 2-3. No reason for high INR.    Warfarin Dose: Decrease today's dose to 2.5 mg, then resume same regimen (5 mg every Mon./Fri.; 2.5 mg all other days)     Follow Up: 2 weeks      Brianda Walden RPH PharmD, BCACP 5/20/2024 9:09 AM    For Pharmacy Admin Tracking Only    Intervention Detail: Dose Adjustment: 1, reason: Therapy De-escalation  Total # of Interventions Recommended: 1  Total # of Interventions Accepted: 1  Time Spent (min): 15

## 2024-06-03 ENCOUNTER — HOSPITAL ENCOUNTER (OUTPATIENT)
Dept: PHARMACY | Age: 69
Setting detail: THERAPIES SERIES
Discharge: HOME | End: 2024-06-03
Payer: MEDICARE

## 2024-06-03 VITALS
DIASTOLIC BLOOD PRESSURE: 85 MMHG | BODY MASS INDEX: 26.34 KG/M2 | WEIGHT: 168.2 LBS | HEART RATE: 58 BPM | SYSTOLIC BLOOD PRESSURE: 138 MMHG

## 2024-06-03 DIAGNOSIS — I48.0 PAF (PAROXYSMAL ATRIAL FIBRILLATION) (HCC): Primary | ICD-10-CM

## 2024-06-03 LAB — INTERNATIONAL NORMALIZATION RATIO, POC: 2.4

## 2024-06-03 PROCEDURE — 99211 OFF/OP EST MAY X REQ PHY/QHP: CPT

## 2024-06-03 PROCEDURE — 85610 PROTHROMBIN TIME: CPT

## 2024-06-03 NOTE — PROGRESS NOTES
[facility] Anticoagulation Clinic (Medication Management)  [address] [place], [address]  Phone: [phone]    Face-To-Face Visit  Patient Findings       Positives:  Upcoming invasive procedure (Colonscopy upcoming. Seeing Dr. Bañuelos on 6/5 to schedule.), Upcoming dental procedure (8/13 periodontal intervention, scrape teeth below gumline. Patient does not know if they have to stop warfarin, will ask.)    Negatives:  Signs/symptoms of thrombosis, Signs/symptoms of bleeding, Laboratory test error suspected, Change in health, Change in alcohol use, Change in activity, Emergency department visit, Missed doses, Extra doses, Change in medications, Change in diet/appetite, Hospital admission, Bruising, Other complaints           Assessment/Plan:  Warfarin indication: atrial fibrillation     INR today is therapeutic at 2.4, goal is 2-3    Warfarin Dose: Same.  5 mg every Mon, Fri; 2.5 mg all other days     Follow Up: 4 weeks      Ryan Galeana, PharmD, [facility]  PGY-1 Pharmacy Resident  [phone]   6/3/2024 10:40 AM    For Pharmacy Admin Tracking Only    Intervention Detail:   Total # of Interventions Recommended: 0  Total # of Interventions Accepted: 0  Time Spent (min): 15

## 2024-06-05 ENCOUNTER — TELEPHONE (OUTPATIENT)
Dept: PHARMACY | Age: 69
End: 2024-06-05

## 2024-06-05 DIAGNOSIS — I48.0 PAF (PAROXYSMAL ATRIAL FIBRILLATION) (HCC): Primary | ICD-10-CM

## 2024-06-05 NOTE — TELEPHONE ENCOUNTER
Patient called requesting call back from pharmacist, Brianda. He stated he is scheduled for a procedure on Tuesday 7/16/24. He needs an INR on Monday 7/15 (we scheduled for 9 am). He was instructed to hold warfarin for 5 days prior to procedure.     Patient is free until 2:20 pm today. After that he will be in a doctor appt.  # [phone]. Thank you.     Electronically signed by Treva Vicente on 6/5/24 at 1:50 PM EDT

## 2024-06-05 NOTE — TELEPHONE ENCOUNTER
Called patient at [phone], left VM stating that it is OK for them to hold warfarin 5 days prior to procedure. Patient was instructed to call back if they have additional questions.     Ryan Galeana, PharmD, [facility]  PGY-1 Pharmacy Resident  [phone]

## 2024-07-01 ENCOUNTER — HOSPITAL ENCOUNTER (OUTPATIENT)
Dept: PHARMACY | Age: 69
Setting detail: THERAPIES SERIES
Discharge: HOME | End: 2024-07-01
Payer: MEDICARE

## 2024-07-01 VITALS — WEIGHT: 168.2 LBS | BODY MASS INDEX: 26.34 KG/M2

## 2024-07-01 DIAGNOSIS — I48.0 PAF (PAROXYSMAL ATRIAL FIBRILLATION) (HCC): Primary | ICD-10-CM

## 2024-07-01 LAB
INTERNATIONAL NORMALIZATION RATIO, POC: 2.7
PROTHROMBIN TIME, POC: NORMAL

## 2024-07-01 PROCEDURE — 99211 OFF/OP EST MAY X REQ PHY/QHP: CPT

## 2024-07-01 PROCEDURE — 85610 PROTHROMBIN TIME: CPT

## 2024-07-01 NOTE — PROGRESS NOTES
[facility] Anticoagulation Clinic (Medication Management)  [address] [place], [address]  Phone: [phone]    Face-To-Face Visit  Patient Findings       Positives:  Upcoming invasive procedure (COLONOSCOPY ON 7/16. WILL BE HOLDING WARFARIN 5 DAYS PRIOR TO THE PROCEDURE.), Upcoming dental procedure (8/13 PERIODONTAL INTERVENTION, SCRAPE TEETH BELOW GUMLINE. PATIENT DOES NOT KNOW IF THEY HAVE TO STOP WARFARIN, HE WILL ASK.)    Negatives:  Signs/symptoms of thrombosis, Signs/symptoms of bleeding, Laboratory test error suspected, Change in health, Change in alcohol use, Change in activity, Emergency department visit, Missed doses, Extra doses, Change in medications, Change in diet/appetite, Hospital admission, Bruising, Other complaints           Assessment/Plan:  Warfarin indication: atrial fibrillation       INR today is therapeutic at 2.7, goal is 2-3    Warfarin Dose: Same.  5 mg every Mon, Fri; 2.5 mg all other days     Follow Up: 2 weeks (THE DAY PRIOR TO COLONOSCOPY)      Chantale Atkins [facility] PharmD 7/1/2024 3:06 PM    For Pharmacy Admin Tracking Only    Intervention Detail:   Total # of Interventions Recommended: 0  Total # of Interventions Accepted: 0  Time Spent (min): 15

## 2024-07-10 RX ORDER — WARFARIN SODIUM 5 MG/1
TABLET ORAL
Qty: 30 TABLET | Refills: 11 | Status: SHIPPED | OUTPATIENT
Start: 2024-07-10

## 2024-07-15 ENCOUNTER — HOSPITAL ENCOUNTER (OUTPATIENT)
Dept: PHARMACY | Age: 69
Setting detail: THERAPIES SERIES
Discharge: HOME | End: 2024-07-15
Payer: MEDICARE

## 2024-07-15 VITALS
SYSTOLIC BLOOD PRESSURE: 158 MMHG | DIASTOLIC BLOOD PRESSURE: 91 MMHG | WEIGHT: 169.2 LBS | BODY MASS INDEX: 26.5 KG/M2 | HEART RATE: 56 BPM

## 2024-07-15 DIAGNOSIS — I48.0 PAF (PAROXYSMAL ATRIAL FIBRILLATION) (HCC): Primary | ICD-10-CM

## 2024-07-15 LAB
INTERNATIONAL NORMALIZATION RATIO, POC: 1.3
PROTHROMBIN TIME, POC: NORMAL

## 2024-07-15 PROCEDURE — 85610 PROTHROMBIN TIME: CPT

## 2024-07-15 PROCEDURE — 99211 OFF/OP EST MAY X REQ PHY/QHP: CPT

## 2024-07-15 NOTE — PROGRESS NOTES
[facility]ticoagulation [facility] (Medication Management)  [address] [place], [address]  Phone: [phone]    Face-To-Face Visit  Patient Findings       Positives:  Upcoming invasive procedure (Colonoscopy tomorrow, holding warfarin 5 days prior to), Upcoming dental procedure (8/13 PERIODONTAL INTERVENTION, SCRAPE TEETH BELOW GUMLINE. PATIENT DOES NOT KNOW IF THEY HAVE TO STOP WARFARIN, HE WILL ASK)    Negatives:  Signs/symptoms of thrombosis, Signs/symptoms of bleeding, Laboratory test error suspected, Change in health, Change in alcohol use, Change in activity, Emergency department visit, Missed doses, Extra doses, Change in medications, Change in diet/appetite, Hospital admission, Bruising, Other complaints           Assessment/Plan:  Warfarin indication: atrial fibrillation      INR today is subtherapeutic at 1.3, goal is 2-3. INR is appropriate for colonoscopy tomorrow.    Warfarin Dose: After colonoscopy, resume same regimen (5 mg every Mon./Fri.; 2.5 mg all other days)    Follow Up: 4 weeks      Brianda Walden RPH PharmD, BCACP 7/15/2024 9:15 AM    For Pharmacy Admin Tracking Only    Intervention Detail:   Total # of Interventions Recommended: 0  Total # of Interventions Accepted: 0  Time Spent (min): 15

## 2024-08-12 ENCOUNTER — ANTI-COAG VISIT (OUTPATIENT)
Age: 69
End: 2024-08-12
Payer: MEDICARE

## 2024-08-12 VITALS
BODY MASS INDEX: 26.72 KG/M2 | DIASTOLIC BLOOD PRESSURE: 87 MMHG | SYSTOLIC BLOOD PRESSURE: 151 MMHG | WEIGHT: 170.6 LBS | HEART RATE: 62 BPM

## 2024-08-12 DIAGNOSIS — I48.0 PAF (PAROXYSMAL ATRIAL FIBRILLATION) (HCC): Primary | ICD-10-CM

## 2024-08-12 LAB
INTERNATIONAL NORMALIZATION RATIO, POC: 2.5
PROTHROMBIN TIME, POC: 0

## 2024-08-12 PROCEDURE — 85610 PROTHROMBIN TIME: CPT

## 2024-08-12 PROCEDURE — 99211 OFF/OP EST MAY X REQ PHY/QHP: CPT

## 2024-08-12 NOTE — PROGRESS NOTES
[facility] Anticoagulation [facility] (Medication Management)  [address] [place], [address]  Phone: [phone]    Face-To-Face Visit  Patient Findings       Positives:  Upcoming dental procedure (8/13 periodontal intervention, scrape teeth below gumline. He did not hold warfarin prior to.)    Negatives:  Signs/symptoms of thrombosis, Signs/symptoms of bleeding, Laboratory test error suspected, Change in health, Change in alcohol use, Change in activity, Upcoming invasive procedure, Emergency department visit, Missed doses, Extra doses, Change in medications, Change in diet/appetite, Hospital admission, Bruising, Other complaints    Comments:  10/31: PCP           Assessment/Plan:  Warfarin indication: atrial fibrillation      INR today is therapeutic at 2.5, goal is 2-3.    Warfarin Dose: same (5 mg every Mon./Fri.; 2.5 mg all other days)    Follow Up: 4 weeks      Brianda Walden RPH PharmD, BCACP 8/12/2024 8:48 AM    For Pharmacy Admin Tracking Only    Intervention Detail:   Total # of Interventions Recommended: 0  Total # of Interventions Accepted: 0  Time Spent (min): 15

## 2024-08-27 RX ORDER — METOPROLOL SUCCINATE 50 MG/1
50 TABLET, EXTENDED RELEASE ORAL 2 TIMES DAILY
Qty: 60 TABLET | Refills: 0 | Status: SHIPPED | OUTPATIENT
Start: 2024-08-27

## 2024-09-09 ENCOUNTER — ANTI-COAG VISIT (OUTPATIENT)
Age: 69
End: 2024-09-09
Payer: MEDICARE

## 2024-09-09 VITALS
DIASTOLIC BLOOD PRESSURE: 87 MMHG | WEIGHT: 167.8 LBS | BODY MASS INDEX: 26.28 KG/M2 | HEART RATE: 56 BPM | SYSTOLIC BLOOD PRESSURE: 146 MMHG

## 2024-09-09 DIAGNOSIS — I48.0 PAF (PAROXYSMAL ATRIAL FIBRILLATION) (HCC): Primary | ICD-10-CM

## 2024-09-09 LAB
INTERNATIONAL NORMALIZATION RATIO, POC: 3.3
PROTHROMBIN TIME, POC: 0

## 2024-09-09 PROCEDURE — 99212 OFFICE O/P EST SF 10 MIN: CPT

## 2024-09-09 PROCEDURE — 85610 PROTHROMBIN TIME: CPT

## 2024-09-30 ENCOUNTER — ANTI-COAG VISIT (OUTPATIENT)
Age: 69
End: 2024-09-30
Payer: MEDICARE

## 2024-09-30 VITALS
WEIGHT: 167.8 LBS | BODY MASS INDEX: 26.28 KG/M2 | HEART RATE: 63 BPM | DIASTOLIC BLOOD PRESSURE: 81 MMHG | SYSTOLIC BLOOD PRESSURE: 138 MMHG

## 2024-09-30 DIAGNOSIS — I48.0 PAF (PAROXYSMAL ATRIAL FIBRILLATION) (HCC): Primary | ICD-10-CM

## 2024-09-30 LAB
INTERNATIONAL NORMALIZATION RATIO, POC: 3
PROTHROMBIN TIME, POC: 0

## 2024-09-30 PROCEDURE — 99211 OFF/OP EST MAY X REQ PHY/QHP: CPT

## 2024-09-30 PROCEDURE — 85610 PROTHROMBIN TIME: CPT

## 2024-09-30 NOTE — PROGRESS NOTES
[facility] Anticoagulation Clinic (Medication Management)  [address] [place], [address]  Phone: [phone]    Face-To-Face Visit  Patient Findings       Negatives:  Signs/symptoms of thrombosis, Signs/symptoms of bleeding, Laboratory test error suspected, Change in health, Change in alcohol use, Change in activity, Upcoming invasive procedure, Emergency department visit, Upcoming dental procedure, Missed doses, Extra doses, Change in medications, Change in diet/appetite, Hospital admission, Bruising, Other complaints    Comments:  PCP 10/31           Assessment/Plan:  Warfarin indication: atrial fibrillation      INR today is therapeutic at 3, goal is 2-3.    Warfarin Dose: same (5 mg every Mon/Fri; 2.5 mg all other days)    Follow Up: 4 weeks      Brinada Walden ZULEMA PharmD, BCACP 9/30/2024 8:32 AM    For Pharmacy Admin Tracking Only    Intervention Detail:   Total # of Interventions Recommended: 0  Total # of Interventions Accepted: 0  Time Spent (min): 15

## 2024-10-08 RX ORDER — METOPROLOL SUCCINATE 50 MG/1
TABLET, EXTENDED RELEASE ORAL
Qty: 60 TABLET | Refills: 0 | Status: SHIPPED | OUTPATIENT
Start: 2024-10-08

## 2024-10-30 ENCOUNTER — ANTI-COAG VISIT (OUTPATIENT)
Age: 69
End: 2024-10-30
Payer: MEDICARE

## 2024-10-30 VITALS
WEIGHT: 167.2 LBS | HEART RATE: 74 BPM | SYSTOLIC BLOOD PRESSURE: 143 MMHG | DIASTOLIC BLOOD PRESSURE: 87 MMHG | BODY MASS INDEX: 26.19 KG/M2

## 2024-10-30 DIAGNOSIS — I48.0 PAF (PAROXYSMAL ATRIAL FIBRILLATION) (HCC): Primary | ICD-10-CM

## 2024-10-30 LAB
INTERNATIONAL NORMALIZATION RATIO, POC: 2.9
PROTHROMBIN TIME, POC: 0

## 2024-10-30 PROCEDURE — 85610 PROTHROMBIN TIME: CPT

## 2024-10-30 PROCEDURE — 99211 OFF/OP EST MAY X REQ PHY/QHP: CPT

## 2024-10-30 NOTE — PROGRESS NOTES
[facility]tico[facility] (Medication Management)  [address] [place], [address]  Phone: [phone]    Face-To-Face Visit  Patient Findings       Negatives:  Signs/symptoms of thrombosis, Signs/symptoms of bleeding, Laboratory test error suspected, Change in health, Change in alcohol use, Change in activity, Upcoming invasive procedure, Emergency department visit, Upcoming dental procedure, Missed doses, Extra doses, Change in medications, Change in diet/appetite, Hospital admission, Bruising, Other complaints    Comments:  PCP 10/31  Dr. Grewal 11/4           Assessment/Plan:  Warfarin indication: atrial fibrillation      INR today is therapeutic at 2.9, goal is 2-3.    Warfarin Dose: same (5 mg Monday; Friday; 2.5 mg all other days)    Follow Up: 4 weeks      Brianda Walden [facility] PharmD, BCACP 10/30/2024 8:48 AM    For Pharmacy Admin Tracking Only    Intervention Detail:   Total # of Interventions Recommended: 0  Total # of Interventions Accepted: 0  Time Spent (min): 15

## 2024-11-04 ENCOUNTER — TELEPHONE (OUTPATIENT)
Age: 69
End: 2024-11-04

## 2024-11-04 ENCOUNTER — OFFICE VISIT (OUTPATIENT)
Dept: CARDIOLOGY CLINIC | Age: 69
End: 2024-11-04
Payer: MEDICARE

## 2024-11-04 VITALS
HEART RATE: 65 BPM | RESPIRATION RATE: 16 BRPM | HEIGHT: 67 IN | WEIGHT: 168.4 LBS | DIASTOLIC BLOOD PRESSURE: 88 MMHG | SYSTOLIC BLOOD PRESSURE: 143 MMHG | BODY MASS INDEX: 26.43 KG/M2

## 2024-11-04 DIAGNOSIS — I48.0 PAF (PAROXYSMAL ATRIAL FIBRILLATION) (HCC): Primary | ICD-10-CM

## 2024-11-04 PROCEDURE — G8427 DOCREV CUR MEDS BY ELIG CLIN: HCPCS | Performed by: INTERNAL MEDICINE

## 2024-11-04 PROCEDURE — 93000 ELECTROCARDIOGRAM COMPLETE: CPT | Performed by: INTERNAL MEDICINE

## 2024-11-04 PROCEDURE — 1160F RVW MEDS BY RX/DR IN RCRD: CPT | Performed by: INTERNAL MEDICINE

## 2024-11-04 PROCEDURE — G8484 FLU IMMUNIZE NO ADMIN: HCPCS | Performed by: INTERNAL MEDICINE

## 2024-11-04 PROCEDURE — 1159F MED LIST DOCD IN RCRD: CPT | Performed by: INTERNAL MEDICINE

## 2024-11-04 PROCEDURE — G8419 CALC BMI OUT NRM PARAM NOF/U: HCPCS | Performed by: INTERNAL MEDICINE

## 2024-11-04 PROCEDURE — 1123F ACP DISCUSS/DSCN MKR DOCD: CPT | Performed by: INTERNAL MEDICINE

## 2024-11-04 PROCEDURE — 3017F COLORECTAL CA SCREEN DOC REV: CPT | Performed by: INTERNAL MEDICINE

## 2024-11-04 PROCEDURE — 1036F TOBACCO NON-USER: CPT | Performed by: INTERNAL MEDICINE

## 2024-11-04 PROCEDURE — 99214 OFFICE O/P EST MOD 30 MIN: CPT | Performed by: INTERNAL MEDICINE

## 2024-11-04 NOTE — TELEPHONE ENCOUNTER
I spoke to Sam.  He was inquiring about starting a product called K2D3.  It contains 100 mcg vitamin K.  I told him taking vitamin D by itself would be preferred since the large amount of vitamin K would require a change in his warfarin dose.  Patient agreed and plans to take vitamin D 4000 units daily.   Brianda Walden [facility], PharmD, BCACP, 11/4/2024 12:27 PM

## 2024-11-04 NOTE — PROGRESS NOTES
Chief Complaint   Patient presents with    Atrial Fibrillation       Patient Active Problem List    Diagnosis Date Noted    PAF (paroxysmal atrial fibrillation) ([facility]) 12/04/2023     Overview Note:     A. CHADSVASC = 2  B. Outside echo with no structural disease, normal LA size      COPD (chronic obstructive pulmonary disease) ([facility]) 12/01/2023    HLD (hyperlipidemia) 12/01/2023       Current Outpatient Medications   Medication Sig Dispense Refill    metoprolol succinate (TOPROL XL) 50 MG extended release tablet TAKE ONE TABLET BY MOUTH IN THE MORNING AND AT BEDTIME (Patient taking differently: 1 tablet every morning and 0.5 tablet every evening) 60 tablet 0    warfarin (COUMADIN) 5 MG tablet Take 0.5 or 1 tablet by mouth once daily as directed by Anticoagulation Clinic 30 tablet 11    MELATONIN PO Take 7 mg by mouth nightly as needed      vitamin C (ASCORBIC ACID) 500 MG tablet Take 1 tablet by mouth daily      metFORMIN (GLUCOPHAGE-XR) 500 MG extended release tablet Take 1 tablet by mouth daily      levothyroxine (SYNTHROID) 25 MCG tablet Take 1.5 tablets by mouth Daily Indications: 37.5 mg      Glucosamine-Chondroit-Vit C-Mn (GLUCOSAMINE CHONDR 1500 COMPLX PO) Take 2 tablets by mouth daily      Zinc 25 MG TABS Take 25 mg by mouth daily      Cholecalciferol (VITAMIN D3) 50 MCG (2000 UT) CAPS Take 1 capsule by mouth daily      vitamin E 400 UNIT capsule Take 1 capsule by mouth daily      magnesium oxide (MAG-OX) 400 (240 Mg) MG tablet Take 1 tablet by mouth daily      Multiple Vitamins-Minerals (THERAPEUTIC MULTIVITAMIN-MINERALS) tablet Take 1 tablet by mouth daily [place] Over 50, 30 mcg vitamin K      atorvastatin (LIPITOR) 10 MG tablet Take 1 tablet by mouth daily       No current facility-administered medications for this visit.        Allergies   Allergen Reactions    Shellfish-Derived Products Hives    Codeine Nausea And Vomiting       Vitals:    11/04/24 0747   BP: (!) 143/88   Pulse: 65   Resp: 16

## 2024-11-04 NOTE — TELEPHONE ENCOUNTER
Patient left message on  requesting call back from Brianda. Please ALDEN @ [phone]. Thank you.    Electronically signed by Treva Vicente on 11/4/24 at 10:32 AM EST

## 2024-11-05 RX ORDER — METOPROLOL SUCCINATE 50 MG/1
TABLET, EXTENDED RELEASE ORAL
Qty: 60 TABLET | Refills: 4 | Status: SHIPPED | OUTPATIENT
Start: 2024-11-05

## 2024-11-21 DIAGNOSIS — I48.0 PAF (PAROXYSMAL ATRIAL FIBRILLATION) (HCC): Primary | ICD-10-CM

## 2024-11-26 ENCOUNTER — ANTI-COAG VISIT (OUTPATIENT)
Age: 69
End: 2024-11-26
Payer: MEDICARE

## 2024-11-26 VITALS
SYSTOLIC BLOOD PRESSURE: 116 MMHG | WEIGHT: 168.8 LBS | DIASTOLIC BLOOD PRESSURE: 79 MMHG | HEART RATE: 66 BPM | BODY MASS INDEX: 26.44 KG/M2

## 2024-11-26 DIAGNOSIS — I48.0 PAF (PAROXYSMAL ATRIAL FIBRILLATION) (HCC): Primary | ICD-10-CM

## 2024-11-26 LAB
INTERNATIONAL NORMALIZATION RATIO, POC: 1.7
PROTHROMBIN TIME, POC: 0

## 2024-11-26 PROCEDURE — 85610 PROTHROMBIN TIME: CPT

## 2024-11-26 PROCEDURE — 99212 OFFICE O/P EST SF 10 MIN: CPT

## 2024-11-26 NOTE — PROGRESS NOTES
[facility] Anticoagulation Clinic (Medication Management)  [address] [place], [address]  Phone: [phone]    Face-To-Face Visit  Patient Findings       Positives:  Change in diet/appetite (Increased vitamin K in his diet (cabbage/broccoli))    Negatives:  Signs/symptoms of thrombosis, Signs/symptoms of bleeding, Laboratory test error suspected, Change in health, Change in alcohol use, Change in activity, Upcoming invasive procedure, Emergency department visit, Upcoming dental procedure, Missed doses, Extra doses, Change in medications, Hospital admission, Bruising, Other complaints           Assessment/Plan:  Warfarin indication: atrial fibrillation      INR today is subtherapeutic at 1.7, goal is 2-3. I suspect his diet is likely the cause for drop in INR.    Warfarin Dose: 5 mg booster dose today then retry same regimen as he has been stable on it (5 mg every Mon./Fri.; 2.5 mg all other days)    Follow Up:  Recheck INR in 2 week(s).      Brianda Walden [facility] PharmD, BCACP 11/26/2024 8:59 AM    For Pharmacy Admin Tracking Only    Intervention Detail: Dose Adjustment: 1, reason: Therapy Optimization  Total # of Interventions Recommended: 1  Total # of Interventions Accepted: 1  Time Spent (min): 15

## 2024-12-13 ENCOUNTER — ANTI-COAG VISIT (OUTPATIENT)
Age: 69
End: 2024-12-13
Payer: MEDICARE

## 2024-12-13 VITALS
DIASTOLIC BLOOD PRESSURE: 80 MMHG | SYSTOLIC BLOOD PRESSURE: 124 MMHG | BODY MASS INDEX: 26.5 KG/M2 | WEIGHT: 169.2 LBS | HEART RATE: 58 BPM

## 2024-12-13 DIAGNOSIS — I48.0 PAF (PAROXYSMAL ATRIAL FIBRILLATION) (HCC): Primary | ICD-10-CM

## 2024-12-13 LAB
INTERNATIONAL NORMALIZATION RATIO, POC: 2.3
PROTHROMBIN TIME, POC: 0

## 2024-12-13 PROCEDURE — 85610 PROTHROMBIN TIME: CPT

## 2024-12-13 PROCEDURE — 99211 OFF/OP EST MAY X REQ PHY/QHP: CPT

## 2024-12-13 NOTE — PROGRESS NOTES
[facility] Anticoagulation Clinic (Medication Management)  [address] [place], [address]  Phone: [phone]    Face-To-Face Visit  Patient Findings       Positives:  Signs/symptoms of bleeding (He bit his tongue about 2 weeks ago and it bled about 4 hours.)    Negatives:  Signs/symptoms of thrombosis, Laboratory test error suspected, Change in health, Change in alcohol use, Change in activity, Upcoming invasive procedure, Emergency department visit, Upcoming dental procedure, Missed doses, Extra doses, Change in medications, Change in diet/appetite, Hospital admission, Bruising, Other complaints           Assessment/Plan:  Warfarin indication: atrial fibrillation      INR today is therapeutic at 2.3, goal is 2-3.    Warfarin Dose: same (5 mg every Mon./Fri.; 2.5 mg all other days)    Follow Up:  Recheck INR in 4 week(s).      Brianda Walden [facility] PharmD, BCACP 12/13/2024 9:02 AM    For Pharmacy Admin Tracking Only    Intervention Detail:   Total # of Interventions Recommended: 0  Total # of Interventions Accepted: 0  Time Spent (min): 15

## 2025-01-13 ENCOUNTER — ANTI-COAG VISIT (OUTPATIENT)
Age: 70
End: 2025-01-13
Payer: MEDICARE

## 2025-01-13 VITALS — SYSTOLIC BLOOD PRESSURE: 140 MMHG | WEIGHT: 169.8 LBS | BODY MASS INDEX: 26.59 KG/M2 | DIASTOLIC BLOOD PRESSURE: 96 MMHG

## 2025-01-13 DIAGNOSIS — I48.0 PAF (PAROXYSMAL ATRIAL FIBRILLATION) (HCC): Primary | ICD-10-CM

## 2025-01-13 LAB
INTERNATIONAL NORMALIZATION RATIO, POC: 2.8
PROTHROMBIN TIME, POC: 0

## 2025-01-13 PROCEDURE — 85610 PROTHROMBIN TIME: CPT

## 2025-01-13 PROCEDURE — 99211 OFF/OP EST MAY X REQ PHY/QHP: CPT

## 2025-01-13 NOTE — PROGRESS NOTES
[facility] Anticoagulation [facility] (Medication Management)  [address] [place], [address]  Phone: [phone]    Face-To-Face Visit  Patient Findings       Positives:  Change in health (Patient had norovirus, started 12/27 (vomiting, no diarrhea)), Change in diet/appetite (Increased vitamin K in his diet with salads)    Negatives:  Signs/symptoms of thrombosis, Signs/symptoms of bleeding, Laboratory test error suspected, Change in alcohol use, Change in activity, Upcoming invasive procedure, Emergency department visit, Upcoming dental procedure, Missed doses, Extra doses, Change in medications, Hospital admission, Bruising, Other complaints           Assessment/Plan:  Warfarin indication: atrial fibrillation      INR today is therapeutic at 2.8, goal is 2-3.    Warfarin Dose: same (5 mg every Mon/Fri; 2.5 mg all other days)    Follow Up:  Recheck INR in 4 week(s).      Brianda Walden [facility] PharmD, BCACP 1/13/2025 8:54 AM    For Pharmacy Admin Tracking Only    Intervention Detail:   Total # of Interventions Recommended: 0  Total # of Interventions Accepted: 0  Time Spent (min): 15

## 2025-02-10 ENCOUNTER — ANTI-COAG VISIT (OUTPATIENT)
Age: 70
End: 2025-02-10
Payer: MEDICARE

## 2025-02-10 VITALS
BODY MASS INDEX: 26.75 KG/M2 | WEIGHT: 170.8 LBS | HEART RATE: 79 BPM | DIASTOLIC BLOOD PRESSURE: 89 MMHG | SYSTOLIC BLOOD PRESSURE: 131 MMHG

## 2025-02-10 DIAGNOSIS — I48.0 PAF (PAROXYSMAL ATRIAL FIBRILLATION) (HCC): Primary | ICD-10-CM

## 2025-02-10 LAB
INTERNATIONAL NORMALIZATION RATIO, POC: 2.5
PROTHROMBIN TIME, POC: 0

## 2025-02-10 PROCEDURE — 85610 PROTHROMBIN TIME: CPT

## 2025-02-10 PROCEDURE — 99211 OFF/OP EST MAY X REQ PHY/QHP: CPT

## 2025-02-10 NOTE — PROGRESS NOTES
[facility] Anticoagulation Clinic (Medication Management)  [address] [place], [address]  Phone: [phone]    Face-To-Face Visit  Patient Findings       Negatives:  Signs/symptoms of thrombosis, Signs/symptoms of bleeding, Laboratory test error suspected, Change in health, Change in alcohol use, Change in activity, Upcoming invasive procedure, Emergency department visit, Upcoming dental procedure, Missed doses, Extra doses, Change in medications, Change in diet/appetite, Hospital admission, Bruising, Other complaints           Assessment/Plan:  Warfarin indication: atrial fibrillation        INR today is therapeutic at 2.5, goal is 2-3.    Warfarin Dose: same (5 mg every Mon/Fri; 2.5 mg all other days)     Follow Up:  Recheck INR in 4 week(s).       Electronically signed by Kat Puga RPH, Pharm.D. 2/10/2025 8:55 AM       For Pharmacy Admin Tracking Only    Intervention Detail:   Total # of Interventions Recommended: 0  Total # of Interventions Accepted: 0  Time Spent (min): 10

## 2025-03-10 ENCOUNTER — ANTI-COAG VISIT (OUTPATIENT)
Age: 70
End: 2025-03-10
Payer: MEDICARE

## 2025-03-10 VITALS
BODY MASS INDEX: 26.72 KG/M2 | SYSTOLIC BLOOD PRESSURE: 124 MMHG | HEART RATE: 78 BPM | DIASTOLIC BLOOD PRESSURE: 78 MMHG | WEIGHT: 170.6 LBS

## 2025-03-10 DIAGNOSIS — I48.0 PAF (PAROXYSMAL ATRIAL FIBRILLATION) (HCC): Primary | ICD-10-CM

## 2025-03-10 LAB
INTERNATIONAL NORMALIZATION RATIO, POC: 3.7
PROTHROMBIN TIME, POC: 0

## 2025-03-10 PROCEDURE — 85610 PROTHROMBIN TIME: CPT

## 2025-03-10 PROCEDURE — 99212 OFFICE O/P EST SF 10 MIN: CPT

## 2025-03-10 NOTE — PROGRESS NOTES
[facility] Anticoagulation Clinic (Medication Management)  [address] [place], [address]  Phone: [phone]    Face-To-Face Visit  Patient Findings       Positives:  Change in medications (He took Nyquil last week for cold symptoms)    Negatives:  Signs/symptoms of thrombosis, Signs/symptoms of bleeding, Laboratory test error suspected, Change in health, Change in alcohol use, Change in activity, Upcoming invasive procedure, Emergency department visit, Upcoming dental procedure, Missed doses, Extra doses, Change in diet/appetite, Hospital admission, Bruising, Other complaints           Assessment/Plan:  Warfarin indication: atrial fibrillation      INR today is supratherapeutic at 3.7, goal is 2-3. Patient was sick last week, reports decreased appetite.     Warfarin Dose: Hold today's 5 mg dose then retry same regimen (5 mg every Mon/Fri; 2.5 mg all other days)    Follow Up:  Recheck INR in 2 week(s).      Brianda Walden RPH PharmD, BCACP 3/10/2025 2:17 PM    For Pharmacy Admin Tracking Only    Intervention Detail: Dose Adjustment: 1, reason: Therapy De-escalation  Total # of Interventions Recommended: 1  Total # of Interventions Accepted: 1  Time Spent (min): 15

## 2025-03-26 ENCOUNTER — ANTI-COAG VISIT (OUTPATIENT)
Age: 70
End: 2025-03-26
Payer: MEDICARE

## 2025-03-26 VITALS
DIASTOLIC BLOOD PRESSURE: 90 MMHG | WEIGHT: 168.4 LBS | SYSTOLIC BLOOD PRESSURE: 126 MMHG | BODY MASS INDEX: 26.38 KG/M2 | HEART RATE: 67 BPM

## 2025-03-26 DIAGNOSIS — I48.0 PAF (PAROXYSMAL ATRIAL FIBRILLATION) (HCC): Primary | ICD-10-CM

## 2025-03-26 LAB
INTERNATIONAL NORMALIZATION RATIO, POC: 3.1
PROTHROMBIN TIME, POC: 0

## 2025-03-26 PROCEDURE — 85610 PROTHROMBIN TIME: CPT

## 2025-03-26 PROCEDURE — 99212 OFFICE O/P EST SF 10 MIN: CPT

## 2025-03-26 RX ORDER — LORATADINE 10 MG/1
10 TABLET ORAL DAILY
COMMUNITY

## 2025-03-26 NOTE — PROGRESS NOTES
[facility] Anticoagulation Clinic (Medication Management)  [address] [place], [address]  Phone: [phone]    Face-To-Face Visit  Patient Findings       Positives:  Change in medications (Taking loratadine daily per PCP), Change in diet/appetite (Increased cabbage/broccoli in his diet)    Negatives:  Signs/symptoms of thrombosis, Signs/symptoms of bleeding, Laboratory test error suspected, Change in health, Change in alcohol use, Change in activity, Upcoming invasive procedure, Emergency department visit, Upcoming dental procedure, Missed doses, Extra doses, Hospital admission, Bruising, Other complaints    Comments:  He will need to see urologist, waiting for referral           Assessment/Plan:  Warfarin indication: atrial fibrillation      INR today is supratherapeutic at 3.1, goal is 2-3. Last INR was 3.7; will lower weekly dose since pt does not plan to continue to eat as much vitamin K.    Warfarin Dose: Decrease weekly dose 11% - 5 mg every Monday;2.5 mg all other days    Follow Up:  Recheck INR in 2 week(s).      Brianda Walden [facility] PharmD, BCACP 3/26/2025 4:14 PM    For Pharmacy Admin Tracking Only    Intervention Detail: Dose Adjustment: 1, reason: Therapy De-escalation  Total # of Interventions Recommended: 1  Total # of Interventions Accepted: 1  Time Spent (min): 15

## 2025-04-10 ENCOUNTER — ANTI-COAG VISIT (OUTPATIENT)
Age: 70
End: 2025-04-10
Payer: MEDICARE

## 2025-04-10 VITALS
DIASTOLIC BLOOD PRESSURE: 83 MMHG | HEART RATE: 75 BPM | BODY MASS INDEX: 26.38 KG/M2 | WEIGHT: 168.4 LBS | SYSTOLIC BLOOD PRESSURE: 129 MMHG

## 2025-04-10 DIAGNOSIS — I48.0 PAF (PAROXYSMAL ATRIAL FIBRILLATION) (HCC): Primary | ICD-10-CM

## 2025-04-10 LAB
INTERNATIONAL NORMALIZATION RATIO, POC: 2.1
PROTHROMBIN TIME, POC: 0

## 2025-04-10 PROCEDURE — 85610 PROTHROMBIN TIME: CPT

## 2025-04-10 PROCEDURE — 99211 OFF/OP EST MAY X REQ PHY/QHP: CPT

## 2025-04-10 NOTE — PROGRESS NOTES
[facility] Anticoagulation Clinic (Medication Management)  [address] [place], [address]  Phone: [phone]    Face-To-Face Visit  Patient Findings       Negatives:  Signs/symptoms of thrombosis, Signs/symptoms of bleeding, Laboratory test error suspected, Change in health, Change in alcohol use, Change in activity, Upcoming invasive procedure, Emergency department visit, Upcoming dental procedure, Missed doses, Extra doses, Change in medications, Change in diet/appetite, Hospital admission, Bruising, Other complaints    Comments:  4/18: Urology consult with Dr. Rockwell           Assessment/Plan:  Warfarin indication: atrial fibrillation      INR today is therapeutic at 2.1, goal is 2-3.    Warfarin Dose: same (5 mg every Friday; 2.5 mg all other days)    Follow Up:  Recheck INR in 3 week(s).      Brianda Walden [facility] PharmD, BCACP 4/10/2025 9:22 AM    For Pharmacy Admin Tracking Only    Intervention Detail:   Total # of Interventions Recommended: 0  Total # of Interventions Accepted: 0  Time Spent (min): 15

## 2025-04-30 ENCOUNTER — ANTI-COAG VISIT (OUTPATIENT)
Age: 70
End: 2025-04-30
Payer: MEDICARE

## 2025-04-30 VITALS
BODY MASS INDEX: 26.34 KG/M2 | HEART RATE: 166 BPM | WEIGHT: 168.2 LBS | DIASTOLIC BLOOD PRESSURE: 74 MMHG | SYSTOLIC BLOOD PRESSURE: 115 MMHG

## 2025-04-30 DIAGNOSIS — I48.0 PAF (PAROXYSMAL ATRIAL FIBRILLATION) (HCC): Primary | ICD-10-CM

## 2025-04-30 LAB
INTERNATIONAL NORMALIZATION RATIO, POC: 2.5
PROTHROMBIN TIME, POC: 0

## 2025-04-30 PROCEDURE — 99211 OFF/OP EST MAY X REQ PHY/QHP: CPT

## 2025-04-30 PROCEDURE — 85610 PROTHROMBIN TIME: CPT

## 2025-04-30 RX ORDER — TAMSULOSIN HYDROCHLORIDE 0.4 MG/1
0.4 CAPSULE ORAL DAILY
COMMUNITY
Start: 2025-04-21

## 2025-04-30 NOTE — PROGRESS NOTES
[facility] Anticoagulation Clinic (Medication Management)  [address] [place], [address]  Phone: [phone]    Face-To-Face Visit  Patient Findings       Positives:  Change in medications (Started tamsulosin daily), Change in diet/appetite (Less vitamin K than usual)    Negatives:  Signs/symptoms of thrombosis, Signs/symptoms of bleeding, Laboratory test error suspected, Change in health, Change in alcohol use, Change in activity, Upcoming invasive procedure, Emergency department visit, Upcoming dental procedure, Missed doses, Extra doses, Hospital admission, Bruising, Other complaints           Assessment/Plan:  Warfarin indication: atrial fibrillation       INR today is therapeutic at 2.5, goal is 2-3.     Warfarin Dose: same (5 mg every Friday; 2.5 mg all other days)     Follow Up:  Recheck INR in 4 week(s).     Brianda Walden [facility] PharmD, BCACP 4/30/2025 9:53 AM    For Pharmacy Admin Tracking Only    Intervention Detail:   Total # of Interventions Recommended: 0  Total # of Interventions Accepted: 0  Time Spent (min): 15

## 2025-05-28 ENCOUNTER — ANTI-COAG VISIT (OUTPATIENT)
Age: 70
End: 2025-05-28
Payer: MEDICARE

## 2025-05-28 VITALS
HEART RATE: 60 BPM | DIASTOLIC BLOOD PRESSURE: 85 MMHG | SYSTOLIC BLOOD PRESSURE: 128 MMHG | BODY MASS INDEX: 26.41 KG/M2 | WEIGHT: 168.6 LBS

## 2025-05-28 DIAGNOSIS — I48.0 PAF (PAROXYSMAL ATRIAL FIBRILLATION) (HCC): Primary | ICD-10-CM

## 2025-05-28 LAB
INTERNATIONAL NORMALIZATION RATIO, POC: 2
PROTHROMBIN TIME, POC: 0

## 2025-05-28 PROCEDURE — 85610 PROTHROMBIN TIME: CPT

## 2025-05-28 PROCEDURE — 99211 OFF/OP EST MAY X REQ PHY/QHP: CPT

## 2025-05-28 NOTE — PROGRESS NOTES
[facility] Anticoagulation Clinic (Medication Management)  [address] [place], [place], [address]  Phone: [phone]    Face-To-Face Visit  Patient Findings       Positives:  Change in diet/appetite (Vitamin K diet increased over the past week)    Negatives:  Signs/symptoms of thrombosis, Signs/symptoms of bleeding, Laboratory test error suspected, Change in health, Change in alcohol use, Change in activity, Upcoming invasive procedure, Emergency department visit, Upcoming dental procedure, Missed doses, Extra doses, Change in medications, Hospital admission, Bruising, Other complaints    Comments:  Dr. Rockwell end of June           Assessment/Plan:  Warfarin indication: atrial fibrillation      INR today is therapeutic at 2, goal is 2-3.    Warfarin Dose: same (5 mg every Friday; 2.5 mg all other days)    Follow Up:  Recheck INR in 4 week(s).      Brianda Walden ZULEMA PharmD, BCACP 5/28/2025 8:52 AM    For Pharmacy Admin Tracking Only    Intervention Detail:   Total # of Interventions Recommended: 0  Total # of Interventions Accepted: 0  Time Spent (min): 15

## 2025-05-30 RX ORDER — METOPROLOL SUCCINATE 50 MG/1
TABLET, EXTENDED RELEASE ORAL
Qty: 60 TABLET | Refills: 0 | Status: SHIPPED | OUTPATIENT
Start: 2025-05-30

## 2025-06-23 ENCOUNTER — ANTI-COAG VISIT (OUTPATIENT)
Age: 70
End: 2025-06-23
Payer: MEDICARE

## 2025-06-23 VITALS
DIASTOLIC BLOOD PRESSURE: 84 MMHG | BODY MASS INDEX: 26.31 KG/M2 | WEIGHT: 168 LBS | HEART RATE: 76 BPM | SYSTOLIC BLOOD PRESSURE: 121 MMHG

## 2025-06-23 DIAGNOSIS — I48.0 PAF (PAROXYSMAL ATRIAL FIBRILLATION) (HCC): Primary | ICD-10-CM

## 2025-06-23 LAB
INTERNATIONAL NORMALIZATION RATIO, POC: 2.6
PROTHROMBIN TIME, POC: 0

## 2025-06-23 PROCEDURE — 99211 OFF/OP EST MAY X REQ PHY/QHP: CPT

## 2025-06-23 PROCEDURE — 85610 PROTHROMBIN TIME: CPT

## 2025-06-23 NOTE — PROGRESS NOTES
[facility] Anticoagulation Clinic (Medication Management)  [address] [place], [address]  Phone: [phone]    Face-To-Face Visit  Patient Findings       Negatives:  Signs/symptoms of thrombosis, Signs/symptoms of bleeding, Laboratory test error suspected, Change in health, Change in alcohol use, Change in activity, Upcoming invasive procedure, Emergency department visit, Upcoming dental procedure, Missed doses, Extra doses, Change in medications, Change in diet/appetite, Hospital admission, Bruising, Other complaints           Assessment/Plan:  Warfarin indication: atrial fibrillation      INR today is therapeutic at 2.6, goal is 2-3.    Warfarin Dose: same (5 mg every Friday; 2.5 mg all other days)    Follow Up:  Recheck INR in 5 week(s).    Brianda Walden [facility] PharmD, BCACP 6/23/2025 9:05 AM    For Pharmacy Admin Tracking Only    Intervention Detail:   Total # of Interventions Recommended: 0  Total # of Interventions Accepted: 0  Time Spent (min): 15

## 2025-06-27 RX ORDER — METOPROLOL SUCCINATE 50 MG/1
TABLET, EXTENDED RELEASE ORAL
Qty: 60 TABLET | Refills: 5 | Status: SHIPPED | OUTPATIENT
Start: 2025-06-27

## 2025-07-28 ENCOUNTER — ANTI-COAG VISIT (OUTPATIENT)
Age: 70
End: 2025-07-28
Payer: MEDICARE

## 2025-07-28 VITALS
SYSTOLIC BLOOD PRESSURE: 126 MMHG | DIASTOLIC BLOOD PRESSURE: 79 MMHG | HEART RATE: 68 BPM | WEIGHT: 170.8 LBS | BODY MASS INDEX: 26.75 KG/M2

## 2025-07-28 DIAGNOSIS — I48.0 PAF (PAROXYSMAL ATRIAL FIBRILLATION) (HCC): Primary | ICD-10-CM

## 2025-07-28 LAB
INTERNATIONAL NORMALIZATION RATIO, POC: 2
PROTHROMBIN TIME, POC: 0

## 2025-07-28 PROCEDURE — 85610 PROTHROMBIN TIME: CPT

## 2025-07-28 PROCEDURE — 99211 OFF/OP EST MAY X REQ PHY/QHP: CPT

## 2025-07-28 NOTE — PROGRESS NOTES
[facility] Anticoagulation Clinic (Medication Management)  [address] [place], [address]  Phone: [phone]    Face-To-Face Visit  Patient Findings       Positives:  Upcoming invasive procedure (8/27- Prostrate Surgery - Stop Warfarin 5 days prior), Change in medications (Increase Flomax to BID), Change in diet/appetite (More cabbage over past 3 days)    Negatives:  Signs/symptoms of thrombosis, Signs/symptoms of bleeding, Laboratory test error suspected, Change in health, Change in alcohol use, Change in activity, Emergency department visit, Upcoming dental procedure, Missed doses, Extra doses, Hospital admission, Bruising, Other complaints           Assessment/Plan:  Warfarin indication: atrial fibrillation       INR today is therapeutic at 2, goal is 2-3    Warfarin Dose: same (5 mg every Friday; 2.5 mg all other days)     Follow Up:  Recheck INR in 5 week(s).    Leesa Watt, PharmD, BCPS, BCCCP 7/28/2025 3:59 PM    For Pharmacy Admin Tracking Only    Intervention Detail:   Total # of Interventions Recommended:   Total # of Interventions Accepted:   Time Spent (min): 15

## 2025-08-11 RX ORDER — WARFARIN SODIUM 5 MG/1
TABLET ORAL
Qty: 90 TABLET | Refills: 3 | Status: SHIPPED | OUTPATIENT
Start: 2025-08-11

## 2025-08-11 RX ORDER — WARFARIN SODIUM 5 MG/1
TABLET ORAL
Qty: 30 TABLET | Refills: 0 | OUTPATIENT
Start: 2025-08-11

## 2025-09-03 ENCOUNTER — ANTI-COAG VISIT (OUTPATIENT)
Age: 70
End: 2025-09-03
Payer: MEDICARE

## 2025-09-03 VITALS
DIASTOLIC BLOOD PRESSURE: 68 MMHG | BODY MASS INDEX: 26.34 KG/M2 | HEART RATE: 62 BPM | SYSTOLIC BLOOD PRESSURE: 111 MMHG | WEIGHT: 168.2 LBS

## 2025-09-03 DIAGNOSIS — I48.0 PAF (PAROXYSMAL ATRIAL FIBRILLATION) (HCC): Primary | ICD-10-CM

## 2025-09-03 LAB
INTERNATIONAL NORMALIZATION RATIO, POC: 1.4
PROTHROMBIN TIME, POC: NORMAL

## 2025-09-03 PROCEDURE — 99212 OFFICE O/P EST SF 10 MIN: CPT

## 2025-09-03 PROCEDURE — 85610 PROTHROMBIN TIME: CPT
